# Patient Record
(demographics unavailable — no encounter records)

---

## 2025-01-15 NOTE — ASSESSMENT
[FreeTextEntry1] : plan  We discussed the implications of gross hematuria and the various causes, which include infection, kidney stones, bladder tumor, or kidney tumor. I explained that many times we are not able to find a specific cause of blood in the urine.  The evaluation for hematuria includes urine culture, urine cytology, cystoscopy, and CT urogram.  We will arrange for the patient to undergo these tests, and we will determine further follow-up and management based on the results of these diagnostic studies.

## 2025-01-15 NOTE — PHYSICAL EXAM
[General Appearance - Well Developed] : well developed [General Appearance - Well Nourished] : well nourished [Edema] : no peripheral edema [] : no respiratory distress [Urinary Bladder Findings] : the bladder was normal on palpation [Normal Station and Gait] : the gait and station were normal for the patient's age [No Focal Deficits] : no focal deficits [Oriented To Time, Place, And Person] : oriented to person, place, and time

## 2025-01-15 NOTE — HISTORY OF PRESENT ILLNESS
[FreeTextEntry1] : 56 yo male patient with a pertinent past medical history of HL who presents to clinic today with complaints of gross hematuria happened for 1 day and then it never happened again. had a stinging sensation at the time.  PSA=0.69 (dec 2024) Bowel Movements are regular. Denies history of colonic diverticulum/diverticulitis.   No  surgical Hx including Uroflow, UDS, Cystoscopy. Denies past UTI Hx, denies fever, chills, sweats, flank pain.  Denies history of kidney stones or bladder stones.  No Neurologic Hx. No Hx of DM   IPSS: 2 non-smoker

## 2025-01-15 NOTE — REVIEW OF SYSTEMS
[Blood in urine that you can see] : blood visible in urine [Negative] : Heme/Lymph [FreeTextEntry2] : had some stinging prior to seeing the blood